# Patient Record
(demographics unavailable — no encounter records)

---
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
## 2019-03-14 NOTE — DIAGNOSTIC IMAGING REPORT
ndication: Chest pain

 

Technique: IV administration nonionic contrast. Spiral acquisitions obtained from the

lung bases to the lung apices. Multiplanar and 3-D reconstructions were generated.

Total dose length product 1179.3 mGycm. CTDIvol(s) 34.86 mGy.  Dose reduction

achieved using automated exposure control

 

 

Comparison: 12/28/2017

 

Findings: Exam is limited due to suboptimal arterial bolus quality, respiratory

motion artifact. This precludes exclusion of small peripheral pulmonary emboli. No

large central pulmonary emboli are demonstrated. No evidence of pulmonary arterial

dilatation or right ventricular dilatation. The heart size is borderline enlarged. No

thoracic aortic aneurysm or dissection. There is common origin of the left common

carotid and right brachiocephalic artery, otherwise unremarkable great neck vessels.

 

The lungs demonstrate posterior dependent atelectatic changes. Some very faint

groundglass opacity is seen in the left upper lobe and bilateral lower lobes. This is

a new finding since the prior study. No pleural fluid is demonstrated. No dense

consolidation, no definite nodules or masses.

 

No pericardial effusion. No mediastinal or hilar mass or adenopathy. No axillary or

chest wall mass or adenopathy. Unremarkable esophagus.

 

The included upper abdominal anatomy demonstrates gallstones. This was also described

previously.

 

Impression: Limited assessment of the pulmonary arteries due to suboptimal contrast

bolus and respiratory motion artifact. No gross large vessel central pulmonary emboli

demonstrated

 

Bilateral basilar dependent atelectatic changes. Left upper lobe and bilateral

basilar groundglass opacities may reflect very mild pulmonary edema

 

Borderline cardiomegaly

 

Cholelithiasis, also described previously

 

 

 

The CT scanner at [facility] is accredited by the American College of

Radiology and the scans are performed using protocols designed to limit radiation

exposure to as low as reasonably achievable to attain images of sufficient resolution

adequate for diagnostic evaluation.

## 2019-08-06 NOTE — NUR
ED Nurse Note:



PT WALKED IN TO ER TODAY FROM HOME. AOX4. PT C/O SOB X 3 DAYS ALONG WITH 
BILATERAL LEG PAIN BEHIND CALF, PAIN 10/10 X 3 DAYS AGO. 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 AT BEDSIDE, PT ABLE TO SPEAK IN FULL SENTENCES. NO 
SIGNS OF RESPIRATORY DISTRESS, RETRACTIONS OR ACCESSORY MUSCLE USE NOTED. FULL 
ROM OF LOWER EXTREMITIES, CIRCULATION AND SENSATION INTACT, MUSCLE STRENGHT 
5/5. HR 86, /61, NORMAL SINUS RHYTHM ON CARDIAC MONITOR.

## 2019-08-06 NOTE — NUR
ER Nurse Note:



Pt seen, treated, medically cleared for discharge by ERMD. Discharge 
instuctions and prescriptions given with repeat verbalization by pt. Emphasized 
to follow up with primay care provider; take whole course of medication. 
Explained each medication. All orders completed per ERMD orders. Pt a&ox4, VSS, 
no signs of distress. ID band removed. IV removed; site clean and bandaged. All 
questions answered per pt's questions. Pt left with all belongings, left with 
own transportation.

## 2019-08-06 NOTE — EMERGENCY ROOM REPORT
History of Present Illness


General


Chief Complaint:  General Complaint


Source:  Patient





Present Illness


HPI


53-year-old female presents ED for evaluation.  Complaining of shortness of 

breath.  History of asthma.  States that she ran out of her medications 3 days 

ago.  Notes wheezing.  Denies cough.  Denies chest pain.  Denies fevers or 

chills.  Also complaining of pain to her lower legs.  History of lupus.  

History of DVT.  Takes Coumadin.  States that this may be a lupus flare.  No 

other aggravating relieving factors.  Denies any other associated symptoms


Allergies:  


Coded Allergies:  


     PENICILLINS (Verified  Allergy, Severe, Shortness of Breath, 12/28/17)





Patient History


Past Medical History:  asthma, other - lupus


Past Surgical History:  none


Pertinent Family History:  none


Social History:  Denies: smoking, alcohol use, drug use


Last Menstrual Period:  06/14/2019


Pregnant Now:  No


Immunizations:  UTD


Reviewed Nursing Documentation:  PMH: Agreed; PSxH: Agreed





Nursing Documentation-PMH


Past Medical History:  No History, Except For


Hx Cardiac Problems:  No


Hx Asthma:  Yes


Hx Neurological Problems:  Yes - SLE





Review of Systems


All Other Systems:  negative except mentioned in HPI





Physical Exam





Vital Signs








  Date Time  Temp Pulse Resp B/P (MAP) Pulse Ox O2 Delivery O2 Flow Rate FiO2


 


8/6/19 16:49 97.9 86 20 120/81 (94) 95 Room Air  


 


8/6/19 17:39        21








Sp02 EP Interpretation:  reviewed, normal


General Appearance:  alert, GCS 15, non-toxic, obese


Head:  normocephalic, atraumatic


Eyes:  bilateral eye normal inspection, bilateral eye PERRL


ENT:  hearing grossly normal, normal pharynx, no angioedema, normal voice


Neck:  full range of motion, supple/symm/no masses


Respiratory:  chest non-tender, normal breath sounds, speaking full sentences, 

wheezing


Cardiovascular #1:  regular rate, rhythm, no edema


Cardiovascular #2:  2+ carotid (R), 2+ carotid (L), 2+ radial (R), 2+ radial (L)

, 2+ dorsalis pedis (R), 2+ dorsalis pedis (L)


Gastrointestinal:  normal bowel sounds, non tender, soft, non-distended, no 

guarding, no rebound


Rectal:  deferred


Genitourinary:  normal inspection, no CVA tenderness


Musculoskeletal:  back normal, gait/station normal, normal range of motion, 

swelling - bilateral lower extremities


Neurologic:  alert, oriented x3, responsive, motor strength/tone normal, 

sensory intact, speech normal


Psychiatric:  judgement/insight normal, memory normal, mood/affect normal, no 

suicidal/homicidal ideation


Reflexes:  3+ bicep (R), 3+ bicep (L), 3+ tricep (R), 3+ tricep (L), 3+ knee (R)

, 3+ knee (L)


Skin:  no rash


Lymphatic:  no adenopathy





Medical Decision Making


Diagnostic Impression:  


 Primary Impression:  


 Asthma exacerbation


 Qualified Codes:  J45.901 - Unspecified asthma with (acute) exacerbation


 Additional Impression:  


 Lupus


ER Course


Hospital Course 


54 yo F presents with SOB. h/o asthma. leg pain. h/o lupus and DVT





Differential diagnoses include: URI, bronchitis, asthma/COPD, pneumonia 





Clinical course


Patient placed on stretcher.  After initial history, physical exam reveals an 

obese female in no acute distress.  Bilateral TM unremarkable.  No pharyngeal 

erythema.  No tonsillar exudates.  No lymphadenopathy.  wheezin bialterally.  

LE swelling


I ordered labs, IV fluids, nebs, pain meds, chest x-ray, venous duplex





Labs reviewed-no leukocytosis, hemoglobin/hematocrit stable, electrolytes okay


Chest x-ray no acute process


EKG - NSR, no acute ischemic changes interpreted by me


venous duplex - negative for DVT





Reassessment patient states pain is improved.  Wheezing resolved.  Reassurance 

given.  Will discharge home with prescriptions for albuterol, prednisone, short 

course of Norco, short course of xanax.  States she has a PMD





Diagnosis - asthma exacerbation, lupus





Stable and discharged home with prescriptions for albuterol, prednisone, norco, 

xanax.  Instructed to followup with PMD.  Return to ED if symptoms recur or 

worsen





Labs








Test


  8/6/19


17:20 8/6/19


17:31 8/6/19


18:35


 


White Blood Count


  5.1 K/UL


(4.8-10.8) 


  


 


 


Red Blood Count


  4.13 M/UL


(4.20-5.40) 


  


 


 


Hemoglobin


  10.3 G/DL


(12.0-16.0) 


  


 


 


Hematocrit


  34.0 %


(37.0-47.0) 


  


 


 


Mean Corpuscular Volume 82 FL (80-99)   


 


Mean Corpuscular Hemoglobin


  25.0 PG


(27.0-31.0) 


  


 


 


Mean Corpuscular Hemoglobin


Concent 30.4 G/DL


(32.0-36.0) 


  


 


 


Red Cell Distribution Width


  16.0 %


(11.6-14.8) 


  


 


 


Platelet Count


  183 K/UL


(150-450) 


  


 


 


Mean Platelet Volume


  6.5 FL


(6.5-10.1) 


  


 


 


Neutrophils (%) (Auto)


  62.6 %


(45.0-75.0) 


  


 


 


Lymphocytes (%) (Auto)


  26.3 %


(20.0-45.0) 


  


 


 


Monocytes (%) (Auto)


  5.2 %


(1.0-10.0) 


  


 


 


Eosinophils (%) (Auto)


  4.1 %


(0.0-3.0) 


  


 


 


Basophils (%) (Auto)


  1.9 %


(0.0-2.0) 


  


 


 


Sodium Level


  142 MMOL/L


(136-145) 


  


 


 


Potassium Level


  3.8 MMOL/L


(3.5-5.1) 


  


 


 


Chloride Level


  108 MMOL/L


() 


  


 


 


Carbon Dioxide Level


  24 MMOL/L


(21-32) 


  


 


 


Anion Gap


  10 mmol/L


(5-15) 


  


 


 


Blood Urea Nitrogen


  14 mg/dL


(7-18) 


  


 


 


Creatinine


  0.8 MG/DL


(0.55-1.30) 


  


 


 


Estimat Glomerular Filtration


Rate > 60 mL/min


(>60) 


  


 


 


Glucose Level


  100 MG/DL


() 


  


 


 


Calcium Level


  10.8 MG/DL


(8.5-10.1) 


  


 


 


Total Bilirubin


  0.3 MG/DL


(0.2-1.0) 


  


 


 


Aspartate Amino Transf


(AST/SGOT) 21 U/L (15-37) 


  


  


 


 


Alanine Aminotransferase


(ALT/SGPT) 23 U/L (12-78) 


  


  


 


 


Alkaline Phosphatase


  86 U/L


() 


  


 


 


Troponin I


  0.000 ng/mL


(0.000-0.056) 


  


 


 


Pro-B-Type Natriuretic Peptide


  21 pg/mL


(0-125) 


  


 


 


Total Protein


  8.1 G/DL


(6.4-8.2) 


  


 


 


Albumin


  3.4 G/DL


(3.4-5.0) 


  


 


 


Globulin 4.7 g/dL   


 


Albumin/Globulin Ratio 0.7 (1.0-2.7)   


 


Prothrombin Time


  


  10.1 SEC


(9.30-11.50) 


 


 


Prothromb Time International


Ratio 


  0.9 (0.9-1.1) 


  


 


 


Activated Partial


Thromboplast Time 


  26 SEC (23-33) 


  


 


 


Urine Color   Pale yellow 


 


Urine Appearance   Clear 


 


Urine pH   5 (4.5-8.0) 


 


Urine Specific Gravity


  


  


  1.020


(1.005-1.035)


 


Urine Protein


  


  


  Negative


(NEGATIVE)


 


Urine Glucose (UA)


  


  


  Negative


(NEGATIVE)


 


Urine Ketones


  


  


  Negative


(NEGATIVE)


 


Urine Blood


  


  


  Negative


(NEGATIVE)


 


Urine Nitrite


  


  


  Negative


(NEGATIVE)


 


Urine Bilirubin


  


  


  Negative


(NEGATIVE)


 


Urine Urobilinogen


  


  


  Normal MG/DL


(0.0-1.0)


 


Urine Leukocyte Esterase


  


  


  Negative


(NEGATIVE)








EKG Diagnostic Results


Rate:  normal


Rhythm:  NSR


ST Segments:  no acute changes


ASA given to the pt in ED:  No





Rhythm Strip Diag. Results


EP Interpretation:  yes


Rhythm:  NSR, no PVC's, no ectopy





Chest X-Ray Diagnostic Results


Chest X-Ray Diagnostic Results :  


   Chest X-Ray Ordered:  Yes


   # of Views/Limited/Complete:  1 View


   Indication:  Shortness of Breath


   EP Interpretation:  Yes


   Interpretation:  no consolidation, no effusion, no pneumothorax, no acute 

cardiopulmonary disease


   Impression:  No acute disease


   Electronically Signed by:  Electronically signed by Manuel Escalante MD





CT/MRI/US Diagnostic Results


CT/MRI/US Diagnostic Results :  


   Imaging Test Ordered:  venous duplex\


   Impression


no evidence of DVT bilateral LEs





Last Vital Signs








  Date Time  Temp Pulse Resp B/P (MAP) Pulse Ox O2 Delivery O2 Flow Rate FiO2


 


8/6/19 18:57  91 20  99 Room Air  21


 


8/6/19 17:04 98.1   118/61    








Status:  improved


Disposition:  HOME, SELF-CARE


Condition:  Stable


Scripts


Alprazolam* (XANAX*) 1 Mg Tablet


3 TAB ORAL TID PRN for PRN Agitation/Anxiety for 3 Days, TAB 0 Refills


   Prov: Manuel Escalante MD         8/6/19 


Hydrocodone Bit/Acetaminophen 5-325* (NORCO 5-325*) 1 Each Tablet


1 TAB ORAL Q6H PRN for For Pain, #12 TAB 0 Refills


   Prov: Manuel Escalante MD         8/6/19 


Prednisone* (PREDNISONE*) 20 Mg Tablet


40 MG ORAL DAILY, #10 TAB


   Prov: Manuel Esclaante MD         8/6/19 


Albuterol Sulfate* (ALBUTEROL SULFATE HHN*) 2.5 Mg/3 Ml Vial.neb


2.5 MG HHN Q4H PRN for Shortness of Breath, #25 VIAL


   Prov: Manuel Escalante MD         8/6/19 


Albuterol Sulfate* (ALBUTEROL SULFATE MDI*) 8.5 Gm Hfa.aer.ad


2 PUFF INH Q6H, #1 EA 0 Refills


   Prov: Manuel Escalante MD         8/6/19


Referrals:  


PREFERRED [facility],REFERRING (PCP)











Manuel Escalante MD Aug 6, 2019 19:15

## 2019-08-06 NOTE — NUR
-------------------------------------------------------------------------------

            *** Note undone in EDM - 08/06/19 at 1704 by STERLING ***             

-------------------------------------------------------------------------------

ED Nurse Note:



PT WALKED IN TO ER TODAY FROM HOME. AOX4. PT C/O SOB X 3 DAYS ALONG WITH 
BILATERAL LEG PAIN BEHIND CALF, PAIN 10/10 X 3 DAYS AGO. 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 AT BEDSIDE, PT ABLE TO SPEAK IN FULL SENTENCES. NO 
SIGNS OF RESPIRATORY DISTRESS, RETRACTIONS OR ACCESSORY MUSCLE USE NOTED. FULL 
ROM OF LOWER EXTREMITIES, CIRCULATION AND SENSATION INTACT, MUSCLE STRENGHT 
5/5.

## 2019-08-07 NOTE — CARDIOLOGY REPORT
--------------- APPROVED REPORT --------------





EKG Measurement

Heart [id number]

MT [id number]

[id number]

[id number]

[id number]





Normal sinus rhythm

Cannot rule out Anterior infarct, age undetermined

Abnormal ECG